# Patient Record
Sex: FEMALE | Race: WHITE | NOT HISPANIC OR LATINO
[De-identification: names, ages, dates, MRNs, and addresses within clinical notes are randomized per-mention and may not be internally consistent; named-entity substitution may affect disease eponyms.]

---

## 2019-09-20 PROBLEM — Z00.00 ENCOUNTER FOR PREVENTIVE HEALTH EXAMINATION: Status: ACTIVE | Noted: 2019-09-20

## 2019-09-23 ENCOUNTER — APPOINTMENT (OUTPATIENT)
Dept: ORTHOPEDIC SURGERY | Facility: CLINIC | Age: 59
End: 2019-09-23
Payer: COMMERCIAL

## 2019-09-23 VITALS — HEIGHT: 65 IN | BODY MASS INDEX: 20.83 KG/M2 | WEIGHT: 125 LBS

## 2019-09-23 DIAGNOSIS — Z78.9 OTHER SPECIFIED HEALTH STATUS: ICD-10-CM

## 2019-09-23 DIAGNOSIS — Z80.1 FAMILY HISTORY OF MALIGNANT NEOPLASM OF TRACHEA, BRONCHUS AND LUNG: ICD-10-CM

## 2019-09-23 DIAGNOSIS — Z72.89 OTHER PROBLEMS RELATED TO LIFESTYLE: ICD-10-CM

## 2019-09-23 PROCEDURE — 99204 OFFICE O/P NEW MOD 45 MIN: CPT | Mod: 25

## 2019-09-23 PROCEDURE — 29515 APPLICATION SHORT LEG SPLINT: CPT | Mod: RT

## 2019-09-23 PROCEDURE — 73610 X-RAY EXAM OF ANKLE: CPT | Mod: RT

## 2019-09-23 NOTE — REVIEW OF SYSTEMS
[Negative] : Heme/Lymph [Joint Pain] : joint pain [Joint Stiffness] : joint stiffness [Joint Swelling] : joint swelling

## 2019-09-23 NOTE — PHYSICAL EXAM
[de-identified] : RIGHT ankle\par splint was removed.\par There is a nickel-sized area of eschar over the medial malleolus with minimal surrounding erythema.\par Moderate edema around the ankle with resolving ecchymoses. Edema in the foot.\par Nontender in her calf.\par Tender medial and lateral malleolus.\par She can wiggle her toes.\par Normal capillary refill. [de-identified] : \par X-rays of the RIGHT ankle AP and lateral views show fracture of the medial and lateral malleolus with slight displacement of the lateral malleolus fracture. Images are in a splint. mortise appears intact.\par X-rays RIGHT ankle nonweightbearing 3 views today were taken with the splint off and show displaced lateral malleolus fracture and displaced medial malleolus fracture with some widening medially of the mortise.There also appears to be a small less than 20% posterior malleolar fragment
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
## 2019-09-23 NOTE — PROCEDURE
[de-identified] : \par Application of new posterior splint. I removed the side bars and re re padded and made a new posterior and U-splint.

## 2019-09-26 ENCOUNTER — APPOINTMENT (OUTPATIENT)
Dept: ORTHOPEDIC SURGERY | Facility: CLINIC | Age: 59
End: 2019-09-26
Payer: COMMERCIAL

## 2019-09-26 PROCEDURE — 99214 OFFICE O/P EST MOD 30 MIN: CPT

## 2019-09-26 NOTE — PROCEDURE
[de-identified] : \par Eschar was debrided removing the proximal two thirds with blunt debridement and then a sharp scissors to cut the dead tissue away.\par Discussed with Xeroform gauze dressing followed by sterile gauze dressing, web roll and then the posterior and U-splint was applied with a lot of padding.\par
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
## 2019-09-26 NOTE — PHYSICAL EXAM
[de-identified] : RIGHT ankle\par splint was removed.\par The black eschar medial malleolus has softened up significantly and with just gently cleaning and wiping the eschar using a small amount of Betadine the upper two thirds of the eschar peeled off. The underlying skin is starting to heal and there was no significant bleeding.\par mild to moderate ankle edema.Edema has decreased. No erythema. Minimal resolving ecchymoses.\par Tender medial and lateral malleoli.\par Sensation is intact in the foot.\par No tenderness in the calf or popliteal hiatus. [de-identified] : no new x-rays

## 2019-09-26 NOTE — HISTORY OF PRESENT ILLNESS
[de-identified] : Ms. Urias comes in for followup for her RIGHT ankle. I just saw her 3 days ago but wanted to followup on the wound on the medial ankle.\par Her ankle is feeling the same. When she stopped the ibuprofen and took Tylenol she felt a little more pain on Tylenol but it seems better today.\par When she puts her foot down she feels like blood is rushing in. No fevers or chills\par She is taking Tylenol. \par She is nonweight bearing on the crutches.

## 2019-09-30 ENCOUNTER — APPOINTMENT (OUTPATIENT)
Dept: ORTHOPEDIC SURGERY | Facility: CLINIC | Age: 59
End: 2019-09-30
Payer: COMMERCIAL

## 2019-09-30 PROCEDURE — 99214 OFFICE O/P EST MOD 30 MIN: CPT
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
## 2019-09-30 NOTE — PHYSICAL EXAM
[Crutches] : ambulates with crutches [de-identified] : RIGHT ankle\par the splint was opened to inspect the medial ankle wound.\par the wound has healed further although there was still a small area of remaining eschar which had turned white.\par Tender medial and lateral malleoli.\par Sensation and motor are intact in the foot.\par edema has continued to decrease with increased wrinkling of the skin.\par Mild erythema but no signs of infection.\par Foot is warm with intact sensation. [de-identified] : \par CT scan of the RIGHT ankle performed September 27, 2019. I reviewed the CT scan and it does show that bimalleolar ankle fracture. There is a displaced medial malleolus fracture and lateral malleolus fracture. There is a posterior malleolar fracture as well with a small avulsion likely of the posterior tibia-fibular length segment but also another minimally to nondisplaced fracture line just anterior to that that is in good alignment. Less than 1 mm depression of the articular surface.

## 2019-09-30 NOTE — HISTORY OF PRESENT ILLNESS
[de-identified] : Ms. Urias states that her ankle is feeling a bit better; less painful.\par she has been in the splint and nonweightbearing without any change. She is elevating a lot. She takes Tylenol. No calf pain.\par She was cleared for surgery.
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
## 2019-09-30 NOTE — PROCEDURE
[de-identified] : \par The wound was cleaned with Betadine and normal saline. Remaining eschar was debrided which was only about a 0.5 cm area. The rest of the wound was healing nicely. Xeroform gauze dressing followed by sterile gauze dressing was applied.

## 2019-10-02 ENCOUNTER — RX RENEWAL (OUTPATIENT)
Age: 59
End: 2019-10-02

## 2019-10-03 ENCOUNTER — APPOINTMENT (OUTPATIENT)
Dept: ORTHOPEDIC SURGERY | Facility: AMBULATORY SURGERY CENTER | Age: 59
End: 2019-10-03

## 2019-10-03 ENCOUNTER — OUTPATIENT (OUTPATIENT)
Dept: OUTPATIENT SERVICES | Facility: HOSPITAL | Age: 59
LOS: 1 days | Discharge: HOME | End: 2019-10-03
Payer: COMMERCIAL

## 2019-10-03 PROCEDURE — 27822 TREATMENT OF ANKLE FRACTURE: CPT | Mod: RT

## 2019-10-04 ENCOUNTER — CLINICAL ADVICE (OUTPATIENT)
Age: 59
End: 2019-10-04

## 2019-10-09 PROBLEM — M25.571 ACUTE RIGHT ANKLE PAIN: Noted: 2019-09-23

## 2019-10-11 ENCOUNTER — APPOINTMENT (OUTPATIENT)
Dept: ORTHOPEDIC SURGERY | Facility: CLINIC | Age: 59
End: 2019-10-11
Payer: COMMERCIAL

## 2019-10-11 DIAGNOSIS — M25.571 PAIN IN RIGHT ANKLE AND JOINTS OF RIGHT FOOT: ICD-10-CM

## 2019-10-11 PROCEDURE — 99024 POSTOP FOLLOW-UP VISIT: CPT

## 2019-10-11 PROCEDURE — 73600 X-RAY EXAM OF ANKLE: CPT | Mod: RT

## 2019-10-11 RX ORDER — SILVER SULFADIAZINE 10 MG/G
1 CREAM TOPICAL TWICE DAILY
Qty: 1 | Refills: 0 | Status: COMPLETED | COMMUNITY
Start: 2019-09-26 | End: 2019-10-11

## 2019-10-11 NOTE — HISTORY OF PRESENT ILLNESS
[___ Days Post Op] : post op day #[unfilled] [Clean/Dry/Intact] : clean, dry and intact [Xray (Date:___)] : [unfilled] Xray -  [Hardware in Good Position] : hardware in good position [Good Overall Alignment] : good overall alignment [Fixation Site Stable] : fixation site appears stable [Sutures Removed] : sutures were removed [Procedure: ___] : status post [unfilled] [Discharge] : noted to have a ~M discharge [Scant] : scant [Thick] : thick [Bloody] : bloody [Neuro Intact] : an unremarkable neurological exam [Vascular Intact] : ~T peripheral vascular exam normal [Chills] : no chills [Fever] : no fever [de-identified] : No complaints. She has been NWB Right and elevating. Pain was bad first few days but now is much less. She is taking Tylenol twice a day. [de-identified] : RIGHT ankle\par Healing eschar. Medial incision healing well.\par Distal lateral would well- healed but not full healed lateral incision. Small amount bloody d/c/\par Mild erythema. Mild edema/ ecchymoses.\par Sensation is intact. She can wiggle her toes fine. [de-identified] : 8 days postop doing relatively well. Her pain is under good control. Swelling is quite good considering the trauma and the surgery. There is a small amount of bloody drainage from the proximal extent of her lateral incisions I did not remove the sutures in this area but remove the rest of the sutures distally and on the medial side. The wound was cleaned and dressed. The splint was reapplied. Because the wound is not quite healed I will have her take some Keflex for 3 days and she should continue to elevate. She will followup next week and we hopefully will remove the remaining sutures. If she has any increasing pain or problems she should let me know. Continue nonweightbearing and crutches. She also has a rolling scooter. [de-identified] : Short leg cast applied well padded. Nonweightbearing on crutches.\par Elevate intermittently.\par followup in 2-1/2-3 weeks. At that time I will likely remove the cast and put her in a removable bootto start some early motion.

## 2019-10-17 ENCOUNTER — APPOINTMENT (OUTPATIENT)
Dept: ORTHOPEDIC SURGERY | Facility: CLINIC | Age: 59
End: 2019-10-17
Payer: COMMERCIAL

## 2019-10-17 PROCEDURE — 99024 POSTOP FOLLOW-UP VISIT: CPT

## 2019-10-17 PROCEDURE — 73600 X-RAY EXAM OF ANKLE: CPT | Mod: RT

## 2019-10-17 NOTE — HISTORY OF PRESENT ILLNESS
[Procedure: ___] : status post [unfilled] [___ Weeks Post Op] : [unfilled] weeks post op [Neuro Intact] : an unremarkable neurological exam [Vascular Intact] : ~T peripheral vascular exam normal [Sutures Removed] : sutures were removed [Xray (Date:___)] : [unfilled] Xray -  [Hardware in Good Position] : hardware in good position [Fixation Site Stable] : fixation site appears stable [No Sign of Infection] : is showing no signs of infection [Good Overall Alignment] : good overall alignment [Adequate Pain Control] : has adequate pain control [Fever] : no fever [Erythema] : not erythematous [Discharge] : absent of discharge [Doing Well] : is doing well [de-identified] : Pain is about the same, not decreasing. She's been working from home. She's been nonweightbearing.\par she is taking Tylenol 2 BID [Dehiscence] : not dehisced [de-identified] : Well-padded short leg cast was applied. Elevate intermittently.\par Continue nonweightbearing RIGHT lower extremity.\par Followup in 2.5 weeks [de-identified] : RIGHT ankle\par Splint removed.\par Incisions appear to be healing well. There is still the healing eschar medially that looks more like a dry blister at this pointwithout any skin breakdown.\par Laterally she has some peeling of the skin but otherwise is intact. Wound is intact and proximally healed up nicely.Remaining sutures were removed after cleaning the incisions with peroxide and Betadine. Steri-Strips were removed.

## 2019-10-19 ENCOUNTER — TRANSCRIPTION ENCOUNTER (OUTPATIENT)
Age: 59
End: 2019-10-19

## 2019-11-05 ENCOUNTER — APPOINTMENT (OUTPATIENT)
Dept: ORTHOPEDIC SURGERY | Facility: CLINIC | Age: 59
End: 2019-11-05
Payer: COMMERCIAL

## 2019-11-05 PROCEDURE — 73610 X-RAY EXAM OF ANKLE: CPT | Mod: RT

## 2019-11-05 PROCEDURE — 99024 POSTOP FOLLOW-UP VISIT: CPT

## 2019-11-05 NOTE — HISTORY OF PRESENT ILLNESS
[Procedure: ___] : status post [unfilled] [___ Weeks Post Op] : [unfilled] weeks post op [Healed] : healed [Neuro Intact] : an unremarkable neurological exam [Vascular Intact] : ~T peripheral vascular exam normal [Xray (Date:___)] : [unfilled] Xray -  [Good Overall Alignment] : good overall alignment [Fixation Site Stable] : fixation site appears stable [No Sign of Infection] : is showing no signs of infection [Hardware in Good Position] : hardware in good position [Doing Well] : is doing well [Excellent Pain Control] : has excellent pain control [Chills] : no chills [Fever] : no fever [Erythema] : not erythematous [Discharge] : absent of discharge [Swelling] : not swollen [Dehiscence] : not dehisced [de-identified] : Pain is Milder minimal. She has been nonweightbearing on crutches.No complaints\par she is taking One Tylenol at night to sleep [de-identified] : RIGHT ankle\par eschar over the medial malleolus Healed nicely.\par Minimal tenderness at the surgical sites. [de-identified] : The cast was removed. She was placed in a tall pneumatic walking boot. She should remain nonweightbearing although can touch her foot down lightly at times as long as there is no pain.She can wash the skin and apply antibiotic ointment and motion as needed. Massaging the skin can be good for circulation.Warm soaks and ice. Elevate as needed for swelling.\par Followup in 2-3 weeks

## 2019-11-26 ENCOUNTER — APPOINTMENT (OUTPATIENT)
Dept: ORTHOPEDIC SURGERY | Facility: CLINIC | Age: 59
End: 2019-11-26
Payer: COMMERCIAL

## 2019-11-26 PROCEDURE — 99024 POSTOP FOLLOW-UP VISIT: CPT

## 2019-11-26 PROCEDURE — 73610 X-RAY EXAM OF ANKLE: CPT | Mod: RT

## 2019-11-26 RX ORDER — OXYCODONE 5 MG/1
5 TABLET ORAL
Qty: 12 | Refills: 0 | Status: COMPLETED | COMMUNITY
Start: 2019-10-02 | End: 2019-11-01

## 2019-11-26 RX ORDER — CEPHALEXIN 250 MG/1
250 CAPSULE ORAL 3 TIMES DAILY
Qty: 9 | Refills: 0 | Status: COMPLETED | COMMUNITY
Start: 2019-10-11 | End: 2019-10-18

## 2019-11-26 NOTE — HISTORY OF PRESENT ILLNESS
[Procedure: ___] : status post [unfilled] [___ Weeks Post Op] : [unfilled] weeks post op [Healed] : healed [Neuro Intact] : an unremarkable neurological exam [Xray (Date:___)] : [unfilled] Xray -  [Good Overall Alignment] : good overall alignment [Vascular Intact] : ~T peripheral vascular exam normal [Fixation Site Stable] : fixation site appears stable [Hardware in Good Position] : hardware in good position [Doing Well] : is doing well [Excellent Pain Control] : has excellent pain control [No Sign of Infection] : is showing no signs of infection [Chills] : no chills [Fever] : no fever [Discharge] : absent of discharge [de-identified] : Right ankle is feeling Better. No significant pain at all..\par She is ambulating in the cam boot Partial weightbearing with crutches. [de-identified] : Right ankle\par ROM- stiffness 2 degr DF and 20 degr PF.limited inv and eversion. No crepitus\par eschar healing/ scar.\par Nontender lateral mall and med mall.\par  [de-identified] : Start PT and home exercises.\par Wean out of cam boor, increase WBAT.\par Home exercises working on range of motion, flexibility and progressive weightbearing in and out of the boot.\par F/U 3-4 wks [de-identified] : Fractures look healed. Mortise is mildly narrowed. Disuse osteopenia

## 2019-12-20 ENCOUNTER — APPOINTMENT (OUTPATIENT)
Dept: ORTHOPEDIC SURGERY | Facility: CLINIC | Age: 59
End: 2019-12-20
Payer: COMMERCIAL

## 2019-12-20 PROCEDURE — 73610 X-RAY EXAM OF ANKLE: CPT | Mod: RT

## 2019-12-20 PROCEDURE — 99024 POSTOP FOLLOW-UP VISIT: CPT
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
## 2020-01-15 ENCOUNTER — APPOINTMENT (OUTPATIENT)
Dept: ORTHOPEDIC SURGERY | Facility: CLINIC | Age: 60
End: 2020-01-15
Payer: COMMERCIAL

## 2020-01-15 PROCEDURE — 99214 OFFICE O/P EST MOD 30 MIN: CPT

## 2020-01-15 PROCEDURE — 73600 X-RAY EXAM OF ANKLE: CPT | Mod: RT
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
## 2020-02-12 ENCOUNTER — APPOINTMENT (OUTPATIENT)
Dept: ORTHOPEDIC SURGERY | Facility: CLINIC | Age: 60
End: 2020-02-12
Payer: COMMERCIAL

## 2020-02-12 PROCEDURE — 99214 OFFICE O/P EST MOD 30 MIN: CPT

## 2020-02-12 PROCEDURE — 73610 X-RAY EXAM OF ANKLE: CPT | Mod: RT
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
## 2020-03-10 PROBLEM — R23.4 ESCHAR OF FOOT: Status: RESOLVED | Noted: 2019-09-23 | Resolved: 2020-03-10

## 2020-03-11 ENCOUNTER — APPOINTMENT (OUTPATIENT)
Dept: ORTHOPEDIC SURGERY | Facility: CLINIC | Age: 60
End: 2020-03-11
Payer: COMMERCIAL

## 2020-03-11 DIAGNOSIS — M24.671 ANKYLOSIS, RIGHT ANKLE: ICD-10-CM

## 2020-03-11 DIAGNOSIS — M77.41 METATARSALGIA, RIGHT FOOT: ICD-10-CM
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
## 2020-03-11 DIAGNOSIS — R23.4 CHANGES IN SKIN TEXTURE: ICD-10-CM

## 2020-03-11 PROCEDURE — 73630 X-RAY EXAM OF FOOT: CPT | Mod: RT

## 2020-03-11 PROCEDURE — 99214 OFFICE O/P EST MOD 30 MIN: CPT

## 2020-03-11 PROCEDURE — 73600 X-RAY EXAM OF ANKLE: CPT | Mod: RT
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
## 2020-04-07 ENCOUNTER — APPOINTMENT (OUTPATIENT)
Dept: ORTHOPEDIC SURGERY | Facility: CLINIC | Age: 60
End: 2020-04-07
Payer: COMMERCIAL

## 2020-04-07 DIAGNOSIS — M84.374A STRESS FRACTURE, RIGHT FOOT, INITIAL ENCOUNTER FOR FRACTURE: ICD-10-CM

## 2020-04-07 DIAGNOSIS — S82.51XG: ICD-10-CM

## 2020-04-07 DIAGNOSIS — S82.851D DISPLACED TRIMALLEOLAR FRACTURE OF RIGHT LOWER LEG, SUBSEQUENT ENCOUNTER FOR CLOSED FRACTURE WITH ROUTINE HEALING: ICD-10-CM

## 2020-04-07 PROCEDURE — 99213 OFFICE O/P EST LOW 20 MIN: CPT | Mod: 95
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
## 2020-04-07 NOTE — PHYSICAL EXAM
[Crutches] : ambulates with crutches [de-identified] : RIGHT ankle\par There appears to be mild edema around the ankle posterior to medial. Incisions are all well-healed. Mild edema through the forefoot on the RIGHT and none on the LEFT. There seems to be some tenderness but no severe tenderness when she palpates around the third metatarsal. Mild tenderness medial ankle.\par Range of motion is with about 3-5° dorsiflexion and 30° plantarflexion of the ankle.\par She walks with a slight limp. She can walk on her toes but appears somewhat weak on the RIGHT side. She could walk on her heels but his tighter RIGHT and LEFT.\par Normal color in the foot.\par There is slight numbness distal to the lateral incision itself but otherwise sensation is intact [de-identified] : \par x-rays next visit

## 2023-07-07 ENCOUNTER — NON-APPOINTMENT (OUTPATIENT)
Age: 63
End: 2023-07-07

## 2023-07-10 ENCOUNTER — NON-APPOINTMENT (OUTPATIENT)
Age: 63
End: 2023-07-10

## 2023-07-10 ENCOUNTER — APPOINTMENT (OUTPATIENT)
Dept: BREAST CENTER | Facility: CLINIC | Age: 63
End: 2023-07-10
Payer: COMMERCIAL

## 2023-07-10 VITALS
HEART RATE: 64 BPM | SYSTOLIC BLOOD PRESSURE: 167 MMHG | WEIGHT: 118 LBS | BODY MASS INDEX: 19.66 KG/M2 | HEIGHT: 65 IN | DIASTOLIC BLOOD PRESSURE: 96 MMHG

## 2023-07-10 DIAGNOSIS — Z86.79 PERSONAL HISTORY OF OTHER DISEASES OF THE CIRCULATORY SYSTEM: ICD-10-CM

## 2023-07-10 PROCEDURE — 99205 OFFICE O/P NEW HI 60 MIN: CPT

## 2023-07-10 RX ORDER — LISINOPRIL 20 MG/1
20 TABLET ORAL
Refills: 0 | Status: ACTIVE | COMMUNITY

## 2023-07-10 NOTE — HISTORY OF PRESENT ILLNESS
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 She was referred by PCP Dr. Miley Pennington. She has no prior hx of breast surgery or bx. Denies family history of breast or ovarian cancer. Patient denies palpable masses, skin changes, or nipple discharge bilaterally.\par \par LUZ Lifetime Risk- 33.4%\par \par 5/2/23: B/l MG & US ([facility])- heterogenously dense. R segmental distribution of microcalcs UOQ 11:00 (rec dxMG). US- YAMILETH. BI-RADS 0\par 5/9/23: R MG ([facility])- R 0.8cm small group of amorphous microcalcs 11-12:00 (rec stereo bx). BI-RADS 4\par 5/22/23: R stereo bx of calcs 1:00 (cork clip)- ALH, radial scar, and fibrocystic changes. High risk and concordant- rec surgical consult. Of note, clip is 2.4 cm medial to the actual biopsy site. Repeat CC and 90 degree projection should be obtained prior to needle localization, for surgical planning\par

## 2023-07-10 NOTE — PHYSICAL EXAM
[de-identified] : Bilateral breast/axilla/supraclavicular area: No masses, discharge, or adenopathy

## 2023-07-10 NOTE — PAST MEDICAL HISTORY
[Postmenopausal] : The patient is postmenopausal [Menarche Age ____] : age at menarche was [unfilled] [Menopause Age____] : age at menopause was [unfilled] [Total Preg ___] : G[unfilled] [History of Hormone Replacement Treatment] : has no history of hormone replacement treatment [FreeTextEntry6] : No [FreeTextEntry7] : No [FreeTextEntry8] : N/A

## 2023-07-24 ENCOUNTER — NON-APPOINTMENT (OUTPATIENT)
Age: 63
End: 2023-07-24

## 2023-08-09 ENCOUNTER — NON-APPOINTMENT (OUTPATIENT)
Age: 63
End: 2023-08-09

## 2023-08-23 ENCOUNTER — NON-APPOINTMENT (OUTPATIENT)
Age: 63
End: 2023-08-23

## 2023-08-28 ENCOUNTER — NON-APPOINTMENT (OUTPATIENT)
Age: 63
End: 2023-08-28

## 2023-08-28 DIAGNOSIS — R92.8 OTHER ABNORMAL AND INCONCLUSIVE FINDINGS ON DIAGNOSTIC IMAGING OF BREAST: ICD-10-CM

## 2023-09-12 ENCOUNTER — NON-APPOINTMENT (OUTPATIENT)
Age: 63
End: 2023-09-12

## 2023-10-04 ENCOUNTER — APPOINTMENT (OUTPATIENT)
Dept: BREAST CENTER | Facility: CLINIC | Age: 63
End: 2023-10-04

## 2023-10-10 ENCOUNTER — NON-APPOINTMENT (OUTPATIENT)
Age: 63
End: 2023-10-10

## 2023-10-17 ENCOUNTER — APPOINTMENT (OUTPATIENT)
Dept: BREAST CENTER | Facility: AMBULATORY SURGERY CENTER | Age: 63
End: 2023-10-17
Payer: COMMERCIAL

## 2023-10-17 ENCOUNTER — RESULT REVIEW (OUTPATIENT)
Age: 63
End: 2023-10-17

## 2023-10-17 PROCEDURE — 76098 X-RAY EXAM SURGICAL SPECIMEN: CPT | Mod: 26

## 2023-10-17 PROCEDURE — 14000 TIS TRNFR TRUNK 10 SQ CM/<: CPT | Mod: RT,59

## 2023-10-17 PROCEDURE — 19301 PARTIAL MASTECTOMY: CPT | Mod: RT

## 2023-10-25 ENCOUNTER — APPOINTMENT (OUTPATIENT)
Dept: BREAST CENTER | Facility: CLINIC | Age: 63
End: 2023-10-25
Payer: COMMERCIAL

## 2023-10-25 VITALS
HEART RATE: 67 BPM | SYSTOLIC BLOOD PRESSURE: 152 MMHG | DIASTOLIC BLOOD PRESSURE: 92 MMHG | WEIGHT: 117 LBS | HEIGHT: 65 IN | BODY MASS INDEX: 19.49 KG/M2

## 2023-10-25 PROCEDURE — 99024 POSTOP FOLLOW-UP VISIT: CPT

## 2023-10-25 RX ORDER — AMLODIPINE BESYLATE 5 MG/1
TABLET ORAL
Refills: 0 | Status: ACTIVE | COMMUNITY

## 2024-05-16 ENCOUNTER — APPOINTMENT (OUTPATIENT)
Dept: BREAST CENTER | Facility: CLINIC | Age: 64
End: 2024-05-16
Payer: COMMERCIAL

## 2024-05-16 VITALS
BODY MASS INDEX: 19.33 KG/M2 | HEIGHT: 65 IN | HEART RATE: 65 BPM | SYSTOLIC BLOOD PRESSURE: 141 MMHG | WEIGHT: 116 LBS | DIASTOLIC BLOOD PRESSURE: 92 MMHG

## 2024-05-16 DIAGNOSIS — N64.89 OTHER SPECIFIED DISORDERS OF BREAST: ICD-10-CM

## 2024-05-16 DIAGNOSIS — Z12.39 ENCOUNTER FOR OTHER SCREENING FOR MALIGNANT NEOPLASM OF BREAST: ICD-10-CM

## 2024-05-16 DIAGNOSIS — N60.99 UNSPECIFIED BENIGN MAMMARY DYSPLASIA OF UNSPECIFIED BREAST: ICD-10-CM

## 2024-05-16 DIAGNOSIS — R92.30 DENSE BREASTS, UNSPECIFIED: ICD-10-CM

## 2024-05-16 PROCEDURE — 99213 OFFICE O/P EST LOW 20 MIN: CPT

## 2024-05-16 NOTE — HISTORY OF PRESENT ILLNESS
[FreeTextEntry1] : Patient is a 62yo F who presents today for breast cancer screening. S/p R nloc excisional bx 10/17/23 for bx proven radial scar and ALH. Surgical path yielded radial scar. Denies family history of breast or ovarian cancer. Patient denies palpable masses, skin changes, or nipple discharge bilaterally.  LUZ Lifetime Risk- 33.4%  5/2/23: B/l MG & US ([facility])- heterogeneously dense. R segmental distribution of microcalcs UOQ 11:00 (rec dxMG). US- YAMILETH. BI-RADS 0 5/9/23: R MG ([facility])- R 0.8cm small group of amorphous microcalcs 11-12:00 (rec stereo bx). BI-RADS 4 5/22/23: R stereo bx of calcs 1:00 (cork clip)- ALH, radial scar, and fibrocystic changes. High risk and concordant- rec surgical consult. Of note, clip is 2.4 cm medial to the actual biopsy site. Repeat CC and 90 degree projection should be obtained prior to needle localization, for surgical planning 7/20/23: R MG- heterogeneously dense. R calcs 11-12:00. R tissue clip approx 2cm interior and 3cm medial to bx site 11:00. Rec MRI for further eval and to assess for abnormal enhancement. BI-RADS 0 7/20/23: MRI- R 0.7cm enhancing mass with slightly indistinct margins central breast in region of bx yielding ALH/radial scar (rec MR bx). R displaced bx clip interior/medial to enhancing mass. No additional abnormal findings b/l. BI-RADS 2  9/7/23: R MR bx enhancing mass central (hourglass clip)- Sclerosing adenosis w/ associated calcs and dense stromal fibrosis. Benign and concordant- continue surgical management for bx proven ALH/radial scar (felt to be best localized by residual calcs) 10/17/23: R nloc lumpx- radial scar, fibrocystic changes including dense stromal fibrosis and sclerosing adenosis w/ associated calcs. 5/16/24: B/l MG/US- extremely dense. Stable residual punctate microcalcs in region of surgery, R 1:00 2FN 2cm postsurgical seroma (f/u R magnification mammogram and targeted US in 6 months) BI-RADS 3.

## 2024-05-16 NOTE — PHYSICAL EXAM
[Normocephalic] : normocephalic [EOMI] : extra ocular movement intact [Supple] : supple [No Supraclavicular Adenopathy] : no supraclavicular adenopathy [No Cervical Adenopathy] : no cervical adenopathy [de-identified] : Bilateral breast/axilla/supraclavicular area: No masses, discharge, or adenopathy

## 2024-10-28 ENCOUNTER — NON-APPOINTMENT (OUTPATIENT)
Age: 64
End: 2024-10-28

## 2025-05-21 ENCOUNTER — APPOINTMENT (OUTPATIENT)
Dept: BREAST CENTER | Facility: CLINIC | Age: 65
End: 2025-05-21
Payer: COMMERCIAL

## 2025-05-21 ENCOUNTER — NON-APPOINTMENT (OUTPATIENT)
Age: 65
End: 2025-05-21

## 2025-05-21 VITALS
WEIGHT: 115 LBS | HEIGHT: 65.12 IN | BODY MASS INDEX: 19.16 KG/M2 | HEART RATE: 67 BPM | DIASTOLIC BLOOD PRESSURE: 90 MMHG | SYSTOLIC BLOOD PRESSURE: 145 MMHG

## 2025-05-21 DIAGNOSIS — Z12.39 ENCOUNTER FOR OTHER SCREENING FOR MALIGNANT NEOPLASM OF BREAST: ICD-10-CM

## 2025-05-21 PROCEDURE — 99213 OFFICE O/P EST LOW 20 MIN: CPT
